# Patient Record
Sex: FEMALE | Race: WHITE | URBAN - METROPOLITAN AREA
[De-identification: names, ages, dates, MRNs, and addresses within clinical notes are randomized per-mention and may not be internally consistent; named-entity substitution may affect disease eponyms.]

---

## 2017-04-18 ENCOUNTER — INPATIENT (INPATIENT)
Facility: HOSPITAL | Age: 37
LOS: 2 days | Discharge: HOME | End: 2017-04-21
Attending: OBSTETRICS & GYNECOLOGY | Admitting: OBSTETRICS & GYNECOLOGY
Payer: COMMERCIAL

## 2017-04-18 VITALS — WEIGHT: 149.91 LBS | HEIGHT: 63 IN

## 2017-04-18 LAB
BASOPHILS NFR BLD AUTO: 0.4 % — SIGNIFICANT CHANGE UP (ref 0–2)
EOSINOPHIL NFR BLD AUTO: 1.6 % — SIGNIFICANT CHANGE UP (ref 0–6)
HCT VFR BLD CALC: 34.9 % — SIGNIFICANT CHANGE UP (ref 34.5–45)
HGB BLD-MCNC: 12.4 G/DL — SIGNIFICANT CHANGE UP (ref 11.5–15.5)
LYMPHOCYTES # BLD AUTO: 13.5 % — SIGNIFICANT CHANGE UP (ref 13–44)
MCHC RBC-ENTMCNC: 32.3 PG — SIGNIFICANT CHANGE UP (ref 27–34)
MCHC RBC-ENTMCNC: 35.5 G/DL — SIGNIFICANT CHANGE UP (ref 32–36)
MCV RBC AUTO: 90.9 FL — SIGNIFICANT CHANGE UP (ref 80–100)
MONOCYTES NFR BLD AUTO: 5.9 % — SIGNIFICANT CHANGE UP (ref 2–14)
NEUTROPHILS NFR BLD AUTO: 78.6 % — HIGH (ref 43–77)
PLATELET # BLD AUTO: 195 K/UL — SIGNIFICANT CHANGE UP (ref 150–400)
RBC # BLD: 3.84 M/UL — SIGNIFICANT CHANGE UP (ref 3.8–5.2)
RBC # FLD: 13.5 % — SIGNIFICANT CHANGE UP (ref 10.3–16.9)
WBC # BLD: 12.8 K/UL — HIGH (ref 3.8–10.5)
WBC # FLD AUTO: 12.8 K/UL — HIGH (ref 3.8–10.5)

## 2017-04-18 RX ORDER — CEFAZOLIN SODIUM 1 G
2000 VIAL (EA) INJECTION ONCE
Qty: 0 | Refills: 0 | Status: DISCONTINUED | OUTPATIENT
Start: 2017-04-18 | End: 2017-04-18

## 2017-04-18 RX ORDER — OXYTOCIN 10 UNIT/ML
41.67 VIAL (ML) INJECTION
Qty: 20 | Refills: 0 | Status: DISCONTINUED | OUTPATIENT
Start: 2017-04-19 | End: 2017-04-21

## 2017-04-18 RX ORDER — ACETAMINOPHEN 500 MG
650 TABLET ORAL EVERY 6 HOURS
Qty: 0 | Refills: 0 | Status: DISCONTINUED | OUTPATIENT
Start: 2017-04-19 | End: 2017-04-21

## 2017-04-18 RX ORDER — METOCLOPRAMIDE HCL 10 MG
10 TABLET ORAL ONCE
Qty: 0 | Refills: 0 | Status: DISCONTINUED | OUTPATIENT
Start: 2017-04-18 | End: 2017-04-18

## 2017-04-18 RX ORDER — OXYTOCIN 10 UNIT/ML
333.33 VIAL (ML) INJECTION
Qty: 20 | Refills: 0 | Status: DISCONTINUED | OUTPATIENT
Start: 2017-04-18 | End: 2017-04-20

## 2017-04-18 RX ORDER — GLYCERIN ADULT
1 SUPPOSITORY, RECTAL RECTAL AT BEDTIME
Qty: 0 | Refills: 0 | Status: DISCONTINUED | OUTPATIENT
Start: 2017-04-19 | End: 2017-04-21

## 2017-04-18 RX ORDER — ONDANSETRON 8 MG/1
4 TABLET, FILM COATED ORAL EVERY 6 HOURS
Qty: 0 | Refills: 0 | Status: DISCONTINUED | OUTPATIENT
Start: 2017-04-18 | End: 2017-04-18

## 2017-04-18 RX ORDER — SODIUM CHLORIDE 9 MG/ML
1000 INJECTION, SOLUTION INTRAVENOUS ONCE
Qty: 0 | Refills: 0 | Status: DISCONTINUED | OUTPATIENT
Start: 2017-04-18 | End: 2017-04-18

## 2017-04-18 RX ORDER — SODIUM CHLORIDE 9 MG/ML
1000 INJECTION, SOLUTION INTRAVENOUS
Qty: 0 | Refills: 0 | Status: DISCONTINUED | OUTPATIENT
Start: 2017-04-18 | End: 2017-04-18

## 2017-04-18 RX ORDER — SIMETHICONE 80 MG/1
80 TABLET, CHEWABLE ORAL EVERY 4 HOURS
Qty: 0 | Refills: 0 | Status: DISCONTINUED | OUTPATIENT
Start: 2017-04-19 | End: 2017-04-21

## 2017-04-18 RX ORDER — OXYCODONE HYDROCHLORIDE 5 MG/1
10 TABLET ORAL
Qty: 0 | Refills: 0 | Status: DISCONTINUED | OUTPATIENT
Start: 2017-04-18 | End: 2017-04-18

## 2017-04-18 RX ORDER — DOCUSATE SODIUM 100 MG
100 CAPSULE ORAL
Qty: 0 | Refills: 0 | Status: DISCONTINUED | OUTPATIENT
Start: 2017-04-19 | End: 2017-04-21

## 2017-04-18 RX ORDER — OXYTOCIN 10 UNIT/ML
41.67 VIAL (ML) INJECTION
Qty: 20 | Refills: 0 | Status: DISCONTINUED | OUTPATIENT
Start: 2017-04-18 | End: 2017-04-18

## 2017-04-18 RX ORDER — IBUPROFEN 200 MG
600 TABLET ORAL EVERY 6 HOURS
Qty: 0 | Refills: 0 | Status: DISCONTINUED | OUTPATIENT
Start: 2017-04-18 | End: 2017-04-20

## 2017-04-18 RX ORDER — HYDROMORPHONE HYDROCHLORIDE 2 MG/ML
0.5 INJECTION INTRAMUSCULAR; INTRAVENOUS; SUBCUTANEOUS
Qty: 0 | Refills: 0 | Status: DISCONTINUED | OUTPATIENT
Start: 2017-04-18 | End: 2017-04-18

## 2017-04-18 RX ORDER — TETANUS TOXOID, REDUCED DIPHTHERIA TOXOID AND ACELLULAR PERTUSSIS VACCINE, ADSORBED 5; 2.5; 8; 8; 2.5 [IU]/.5ML; [IU]/.5ML; UG/.5ML; UG/.5ML; UG/.5ML
0.5 SUSPENSION INTRAMUSCULAR ONCE
Qty: 0 | Refills: 0 | Status: DISCONTINUED | OUTPATIENT
Start: 2017-04-19 | End: 2017-04-21

## 2017-04-18 RX ORDER — OXYCODONE HYDROCHLORIDE 5 MG/1
5 TABLET ORAL
Qty: 0 | Refills: 0 | Status: DISCONTINUED | OUTPATIENT
Start: 2017-04-18 | End: 2017-04-18

## 2017-04-18 RX ORDER — DIPHENHYDRAMINE HCL 50 MG
25 CAPSULE ORAL EVERY 6 HOURS
Qty: 0 | Refills: 0 | Status: DISCONTINUED | OUTPATIENT
Start: 2017-04-19 | End: 2017-04-21

## 2017-04-18 RX ORDER — FERROUS SULFATE 325(65) MG
325 TABLET ORAL DAILY
Qty: 0 | Refills: 0 | Status: DISCONTINUED | OUTPATIENT
Start: 2017-04-19 | End: 2017-04-21

## 2017-04-18 RX ORDER — LANOLIN
1 OINTMENT (GRAM) TOPICAL
Qty: 0 | Refills: 0 | Status: DISCONTINUED | OUTPATIENT
Start: 2017-04-19 | End: 2017-04-21

## 2017-04-18 RX ORDER — CITRIC ACID/SODIUM CITRATE 300-500 MG
30 SOLUTION, ORAL ORAL ONCE
Qty: 0 | Refills: 0 | Status: DISCONTINUED | OUTPATIENT
Start: 2017-04-18 | End: 2017-04-18

## 2017-04-18 RX ORDER — SODIUM CHLORIDE 9 MG/ML
1000 INJECTION, SOLUTION INTRAVENOUS
Qty: 0 | Refills: 0 | Status: DISCONTINUED | OUTPATIENT
Start: 2017-04-19 | End: 2017-04-21

## 2017-04-18 RX ORDER — NALOXONE HYDROCHLORIDE 4 MG/.1ML
0.1 SPRAY NASAL
Qty: 0 | Refills: 0 | Status: DISCONTINUED | OUTPATIENT
Start: 2017-04-18 | End: 2017-04-18

## 2017-04-18 RX ORDER — HEPARIN SODIUM 5000 [USP'U]/ML
5000 INJECTION INTRAVENOUS; SUBCUTANEOUS EVERY 12 HOURS
Qty: 0 | Refills: 0 | Status: DISCONTINUED | OUTPATIENT
Start: 2017-04-19 | End: 2017-04-21

## 2017-04-18 RX ORDER — MORPHINE SULFATE 50 MG/1
4 CAPSULE, EXTENDED RELEASE ORAL
Qty: 0 | Refills: 0 | Status: DISCONTINUED | OUTPATIENT
Start: 2017-04-18 | End: 2017-04-21

## 2017-04-18 RX ADMIN — SODIUM CHLORIDE 125 MILLILITER(S): 9 INJECTION, SOLUTION INTRAVENOUS at 17:20

## 2017-04-18 RX ADMIN — Medication 600 MILLIGRAM(S): at 21:12

## 2017-04-18 RX ADMIN — Medication 600 MILLIGRAM(S): at 23:10

## 2017-04-18 RX ADMIN — MORPHINE SULFATE 4 MILLIGRAM(S): 50 CAPSULE, EXTENDED RELEASE ORAL at 22:15

## 2017-04-18 RX ADMIN — MORPHINE SULFATE 4 MILLIGRAM(S): 50 CAPSULE, EXTENDED RELEASE ORAL at 23:10

## 2017-04-19 LAB
HCT VFR BLD CALC: 25.5 % — LOW (ref 34.5–45)
HCT VFR BLD CALC: 28.5 % — LOW (ref 34.5–45)
HGB BLD-MCNC: 8.9 G/DL — LOW (ref 11.5–15.5)
HGB BLD-MCNC: 9.9 G/DL — LOW (ref 11.5–15.5)
MCHC RBC-ENTMCNC: 31.7 PG — SIGNIFICANT CHANGE UP (ref 27–34)
MCHC RBC-ENTMCNC: 31.7 PG — SIGNIFICANT CHANGE UP (ref 27–34)
MCHC RBC-ENTMCNC: 34.7 G/DL — SIGNIFICANT CHANGE UP (ref 32–36)
MCHC RBC-ENTMCNC: 34.9 G/DL — SIGNIFICANT CHANGE UP (ref 32–36)
MCV RBC AUTO: 90.7 FL — SIGNIFICANT CHANGE UP (ref 80–100)
MCV RBC AUTO: 91.3 FL — SIGNIFICANT CHANGE UP (ref 80–100)
PLATELET # BLD AUTO: 144 K/UL — LOW (ref 150–400)
PLATELET # BLD AUTO: 166 K/UL — SIGNIFICANT CHANGE UP (ref 150–400)
RBC # BLD: 2.81 M/UL — LOW (ref 3.8–5.2)
RBC # BLD: 3.12 M/UL — LOW (ref 3.8–5.2)
RBC # FLD: 13.5 % — SIGNIFICANT CHANGE UP (ref 10.3–16.9)
RBC # FLD: 13.8 % — SIGNIFICANT CHANGE UP (ref 10.3–16.9)
T PALLIDUM AB TITR SER: NEGATIVE — SIGNIFICANT CHANGE UP
WBC # BLD: 11.2 K/UL — HIGH (ref 3.8–10.5)
WBC # BLD: 11.5 K/UL — HIGH (ref 3.8–10.5)
WBC # FLD AUTO: 11.2 K/UL — HIGH (ref 3.8–10.5)
WBC # FLD AUTO: 11.5 K/UL — HIGH (ref 3.8–10.5)

## 2017-04-19 RX ORDER — ZINC OXIDE 200 MG/G
1 OINTMENT TOPICAL EVERY 6 HOURS
Qty: 0 | Refills: 0 | Status: DISCONTINUED | OUTPATIENT
Start: 2017-04-19 | End: 2017-04-21

## 2017-04-19 RX ADMIN — Medication 600 MILLIGRAM(S): at 04:00

## 2017-04-19 RX ADMIN — HEPARIN SODIUM 5000 UNIT(S): 5000 INJECTION INTRAVENOUS; SUBCUTANEOUS at 19:21

## 2017-04-19 RX ADMIN — Medication 600 MILLIGRAM(S): at 10:00

## 2017-04-19 RX ADMIN — SIMETHICONE 80 MILLIGRAM(S): 80 TABLET, CHEWABLE ORAL at 00:59

## 2017-04-19 RX ADMIN — HEPARIN SODIUM 5000 UNIT(S): 5000 INJECTION INTRAVENOUS; SUBCUTANEOUS at 06:47

## 2017-04-19 RX ADMIN — Medication 600 MILLIGRAM(S): at 03:10

## 2017-04-19 RX ADMIN — SIMETHICONE 80 MILLIGRAM(S): 80 TABLET, CHEWABLE ORAL at 09:05

## 2017-04-19 RX ADMIN — SODIUM CHLORIDE 125 MILLILITER(S): 9 INJECTION, SOLUTION INTRAVENOUS at 12:15

## 2017-04-19 RX ADMIN — Medication 600 MILLIGRAM(S): at 16:36

## 2017-04-19 RX ADMIN — SODIUM CHLORIDE 125 MILLILITER(S): 9 INJECTION, SOLUTION INTRAVENOUS at 06:47

## 2017-04-19 RX ADMIN — Medication 325 MILLIGRAM(S): at 12:14

## 2017-04-19 RX ADMIN — Medication 600 MILLIGRAM(S): at 09:06

## 2017-04-19 RX ADMIN — Medication 100 MILLIGRAM(S): at 22:40

## 2017-04-19 RX ADMIN — Medication 1 TABLET(S): at 12:14

## 2017-04-19 RX ADMIN — Medication 600 MILLIGRAM(S): at 17:20

## 2017-04-19 NOTE — PROGRESS NOTE ADULT - SUBJECTIVE AND OBJECTIVE BOX
Patient is s/p CSECTION POD#1. Called for heavy bleeding episode after evaluation by resident staff. patient reports feeling better. was eating breakfast and had heavy bleeding come down vaginally. resolved now that she is laying in bed. pain controlled.    I & Os for 24h ending  @ 07:00  =============================================  IN: 750 ml / OUT: 1900 ml / NET: -1150 ml    I & Os for current day (as of  @ 17:42)  =============================================  IN: 0 ml / OUT: 3650 ml / NET: -3650 ml      Objective:  Daily Height in cm: 160.02 (2017 18:47)    Daily Weight Pre-pregnancy in k (2017 18:47)  ICU Vital Signs Last 24 Hrs  T(C): 36.3, Max: 36.9 ( @ 19:15)  T(F): 97.4, Max: 98.5 ( @ 19:15)  HR: 82 (64 - 82)  BP: 104/58 (95/60 - 126/56)  RR: 18 (14 - 18)  SpO2: 98% (92% - 98%)    CAPILLARY BLOOD GLUCOSE    Height (cm): 160 ( @ 18:47)  PHYSICAL EXAM:    Constitutional: well appearing, alert and oriented x3  Breasts: deferred  Gastrointestinal: distended with tympany (expected for post-operative status), appropriately tender to touch, soft without rebound or guarding  Extremities: non edematous.  Psychiatric: appropriate mood and judgement.    Labs:                        9.9    11.5  )-----------( 166      ( 2017 13:09 )             28.5     Medications:  MEDICATIONS  (STANDING):  lactated ringers. 1000milliLiter(s) IV Continuous <Continuous>  diphtheria/tetanus/pertussis (acellular) Vaccine (ADAcel) 0.5milliLiter(s) IntraMuscular once  oxytocin Infusion 41.667milliUNIT(s)/Min IV Continuous <Continuous>  ferrous    sulfate 325milliGRAM(s) Oral daily  prenatal multivitamin 1Tablet(s) Oral daily  oxytocin Infusion 333.333milliUNIT(s)/Min IV Continuous <Continuous>  heparin  Injectable 5000Unit(s) SubCutaneous every 12 hours  ibuprofen  Suspension 600milliGRAM(s) Oral every 6 hours    MEDICATIONS  (PRN):  acetaminophen   Tablet. 650milliGRAM(s) Oral every 6 hours PRN Mild Pain (1 - 3)  acetaminophen   Tablet 650milliGRAM(s) Oral every 6 hours PRN For Temp greater than 38.5 C (101.3 F)  oxyCODONE  5 mG/acetaminophen 325 mG 1Tablet(s) Oral every 3 hours PRN Moderate Pain  oxyCODONE  5 mG/acetaminophen 325 mG 2Tablet(s) Oral every 6 hours PRN Severe Pain  simethicone 80milliGRAM(s) Chew every 4 hours PRN Gas  diphenhydrAMINE   Capsule 25milliGRAM(s) Oral every 6 hours PRN Itching  glycerin Suppository - Adult 1Suppository(s) Rectal at bedtime PRN Constipation  docusate sodium 100milliGRAM(s) Oral two times a day PRN Stool Softening  lanolin Ointment 1Application(s) Topical every 3 hours PRN Sore Nipples  morphine  - Injectable 4milliGRAM(s) IV Push every 15 minutes PRN Severe Pain (7 - 10)

## 2017-04-19 NOTE — PROGRESS NOTE ADULT - ASSESSMENT
A/P 37y G  P   s/p c/s, POD # 1 ,stable  - f/u postop cbc  Diet: Reg diet as tolerated  Pain: OPM  IV fluid: Saline lock  OOB/SCDs/IS  Continue to monitor  Anticipate discharge POD #3 or #4

## 2017-04-19 NOTE — PROGRESS NOTE ADULT - SUBJECTIVE AND OBJECTIVE BOX
Patient evaluated at bedside.   She reports pain is well controlled with  She denies headache, dizziness, chest pain, palpitations, shortness of breathe, nausea, vomiting or heavy vaginal bleeding.  She has not been ambulating or voiding spontaneously due to sue and SCDs. She is passing gas, tolerating clear diet and is breastfeeding.    Physical Exam:  Vital Signs Last 24 Hrs  T(C): 36.9, Max: 36.9 (04-18 @ 19:15)  T(F): 98.5, Max: 98.5 (04-18 @ 19:15)  HR: 69 (64 - 80)  BP: 108/57 (102/68 - 126/56)  BP(mean): --  RR: 14 (14 - 18)  SpO2: 97% (92% - 97%)    GA: NAD, A+0 x 3  CV: RRR  Pulm: CTAB  Breasts: soft, nontender, no palpable masses  Abd: + BS, soft, nontender, nondistended, no rebound or guarding, pressure dressing kept on, uterus firm at midline, 1 fb below umbilicus  : lochia WNL  Sue: in situ and clear   Extremities: no swelling or calf tenderness,                            12.4   12.8  )-----------( 195      ( 18 Apr 2017 17:23 )             34.9

## 2017-04-19 NOTE — PROGRESS NOTE ADULT - ASSESSMENT
A/P:   PABLO BLAKELY 37y Female s/p repeat CD due previous CS.      vaginal bleeding was heavy, resolved. bimanual exam reported to be minimal bleeding. no bleeding on pad per nursing staff. patient feeling improved after hydration and return to bed. will monitor closely for change in status. stable and vital signs normal.

## 2017-04-20 LAB
HCT VFR BLD CALC: 27.5 % — LOW (ref 34.5–45)
HCT VFR BLD CALC: 27.6 % — LOW (ref 34.5–45)
HGB BLD-MCNC: 9.3 G/DL — LOW (ref 11.5–15.5)
HGB BLD-MCNC: 9.6 G/DL — LOW (ref 11.5–15.5)
MCHC RBC-ENTMCNC: 31.1 PG — SIGNIFICANT CHANGE UP (ref 27–34)
MCHC RBC-ENTMCNC: 32.1 PG — SIGNIFICANT CHANGE UP (ref 27–34)
MCHC RBC-ENTMCNC: 33.8 G/DL — SIGNIFICANT CHANGE UP (ref 32–36)
MCHC RBC-ENTMCNC: 34.8 G/DL — SIGNIFICANT CHANGE UP (ref 32–36)
MCV RBC AUTO: 92 FL — SIGNIFICANT CHANGE UP (ref 80–100)
MCV RBC AUTO: 92.3 FL — SIGNIFICANT CHANGE UP (ref 80–100)
PLATELET # BLD AUTO: 174 K/UL — SIGNIFICANT CHANGE UP (ref 150–400)
PLATELET # BLD AUTO: 187 K/UL — SIGNIFICANT CHANGE UP (ref 150–400)
RBC # BLD: 2.99 M/UL — LOW (ref 3.8–5.2)
RBC # BLD: 2.99 M/UL — LOW (ref 3.8–5.2)
RBC # FLD: 14.2 % — SIGNIFICANT CHANGE UP (ref 10.3–16.9)
RBC # FLD: 14.2 % — SIGNIFICANT CHANGE UP (ref 10.3–16.9)
WBC # BLD: 11 K/UL — HIGH (ref 3.8–10.5)
WBC # BLD: 11.3 K/UL — HIGH (ref 3.8–10.5)
WBC # FLD AUTO: 11 K/UL — HIGH (ref 3.8–10.5)
WBC # FLD AUTO: 11.3 K/UL — HIGH (ref 3.8–10.5)

## 2017-04-20 RX ORDER — IBUPROFEN 200 MG
600 TABLET ORAL EVERY 6 HOURS
Qty: 0 | Refills: 0 | Status: DISCONTINUED | OUTPATIENT
Start: 2017-04-20 | End: 2017-04-21

## 2017-04-20 RX ADMIN — Medication 600 MILLIGRAM(S): at 16:28

## 2017-04-20 RX ADMIN — HEPARIN SODIUM 5000 UNIT(S): 5000 INJECTION INTRAVENOUS; SUBCUTANEOUS at 18:16

## 2017-04-20 RX ADMIN — Medication 600 MILLIGRAM(S): at 15:44

## 2017-04-20 RX ADMIN — Medication 1 TABLET(S): at 12:58

## 2017-04-20 RX ADMIN — HEPARIN SODIUM 5000 UNIT(S): 5000 INJECTION INTRAVENOUS; SUBCUTANEOUS at 05:39

## 2017-04-20 RX ADMIN — Medication 600 MILLIGRAM(S): at 10:00

## 2017-04-20 RX ADMIN — ZINC OXIDE 1 APPLICATION(S): 200 OINTMENT TOPICAL at 09:17

## 2017-04-20 RX ADMIN — Medication 325 MILLIGRAM(S): at 12:58

## 2017-04-20 RX ADMIN — Medication 600 MILLIGRAM(S): at 09:16

## 2017-04-20 RX ADMIN — Medication 100 MILLIGRAM(S): at 12:58

## 2017-04-20 NOTE — PROGRESS NOTE ADULT - SUBJECTIVE AND OBJECTIVE BOX
Patient evaluated at bedside.   She reports pain is well controlled with opm  She denies headache, dizziness, chest pain, palpitations, shortness of breathe, nausea, vomiting or heavy vaginal bleeding as she was yesterday. The one episode of vb was evaluated at bedside with a bimanual that resulted in minimal extraction of clots and blood. Repeat CBC at that time showed Hg was stable.  She has been ambulating and voiding spontaneously. She is passing gas, tolerating reg diet and is breastfeeding.    Physical Exam:  Vital Signs Last 24 Hrs  T(C): 37, Max: 37 (04-20 @ 05:53)  T(F): 98.6, Max: 98.6 (04-20 @ 05:53)  HR: 61 (61 - 88)  BP: 123/82 (95/59 - 123/82)  BP(mean): --  RR: 18 (17 - 18)  SpO2: 96% (96% - 99%)    GA: NAD, A+0 x 3  Abd: + BS, soft, nontender, nondistended, no rebound or guarding, pressure dressing kept on, uterus firm at midline, 1 fb below umbilicus  : lochia WNL  Extremities: no swelling or calf tenderness,    Complete Blood Count (04.19.17 @ 13:09)    WBC Count: 11.5 K/uL    RBC Count: 3.12 M/uL    Hemoglobin: 9.9 g/dL    Hematocrit: 28.5 %    Mean Cell Volume: 91.3 fL    Mean Cell Hemoglobin: 31.7 pg    Mean Cell Hemoglobin Conc: 34.7 g/dL    Red Cell Distrib Width: 13.5 %    Platelet Count - Automated: 166 K/uL                        12.4   12.8  )-----------( 195      ( 18 Apr 2017 17:23 )             34.9

## 2017-04-20 NOTE — PROGRESS NOTE ADULT - ASSESSMENT
A/P  yo 37y G 2 P2   s/p c/s, POD # 2 ,stable  1. Pain:OPM  2. GI:REG  3. : VOIDING  4. DVT prophylaxis:SCD  5. Dispo:POD4  6.repeat CBC now  7.f/u vital signs

## 2017-04-20 NOTE — PROGRESS NOTE ADULT - SUBJECTIVE AND OBJECTIVE BOX
Patient evaluated at bedside after passing small clots.    No acute events overnight.  She reports pain is well controlled with OPM  She denies heavy vaginal bleeding or perineal discomfort.  She has been ambulating without assistance, voiding spontaneously.    Physical Exam:  T(C): 37, Max: 37 (04-20 @ 05:53)  HR: 61 (61 - 61)  BP: 123/82 (123/82 - 123/82)  RR: 18 (18 - 18)  SpO2: 96% (96% - 96%)  Wt(kg): --    GA: NAD, A+0 x 3  Abd: + BS, soft, nontender, nondistended, no rebound or guarding, uterus firm at midline,   fb below umbilicus  : lochia WNL  Extremities: no swelling or calf tenderness                            9.9    11.5  )-----------( 166      ( 19 Apr 2017 13:09 )             28.5

## 2017-04-20 NOTE — PROGRESS NOTE ADULT - ASSESSMENT
A/P 37y G  P   s/p c/s, POD # 2 ,stable  Diet: Reg diet as tolerated  Pain: OPM  IV fluid: Saline lock  OOB/SCDs/IS  Continue to monitor  Anticipate discharge POD #3 or #4

## 2017-04-21 VITALS
OXYGEN SATURATION: 98 % | DIASTOLIC BLOOD PRESSURE: 83 MMHG | SYSTOLIC BLOOD PRESSURE: 122 MMHG | TEMPERATURE: 98 F | HEART RATE: 86 BPM | RESPIRATION RATE: 18 BRPM

## 2017-04-21 RX ADMIN — Medication 600 MILLIGRAM(S): at 11:46

## 2017-04-21 RX ADMIN — Medication 600 MILLIGRAM(S): at 05:46

## 2017-04-21 RX ADMIN — HEPARIN SODIUM 5000 UNIT(S): 5000 INJECTION INTRAVENOUS; SUBCUTANEOUS at 06:40

## 2017-04-21 RX ADMIN — Medication 100 MILLIGRAM(S): at 11:45

## 2017-04-21 RX ADMIN — ZINC OXIDE 1 APPLICATION(S): 200 OINTMENT TOPICAL at 11:46

## 2017-04-21 RX ADMIN — Medication 325 MILLIGRAM(S): at 11:45

## 2017-04-21 RX ADMIN — Medication 600 MILLIGRAM(S): at 11:33

## 2017-04-21 RX ADMIN — Medication 600 MILLIGRAM(S): at 05:14

## 2017-04-21 RX ADMIN — Medication 1 TABLET(S): at 11:45

## 2017-04-21 RX ADMIN — Medication 1 APPLICATION(S): at 07:05

## 2017-04-21 NOTE — PROGRESS NOTE ADULT - ATTENDING COMMENTS
patient stable and afebrile  h/h stable over 3 days  no tachycardia  denies any chest pain, sob, palpitations  will d/c home , to continue taking iron tbs 3 times a day  f/up in one week in office for wound check

## 2017-04-21 NOTE — DISCHARGE NOTE OB - PATIENT PORTAL LINK FT
“You can access the [facility] Patient Portal, offered by [facility], by registering with the following website: [email or web address]”

## 2017-04-21 NOTE — DISCHARGE NOTE OB - CARE PLAN
Principal Discharge DX:	Postpartum state  Goal:	n/a  Instructions for follow-up, activity and diet:	Follow up with your OB in 2 weeks. Call the office to schedule your post partum visit.  Regular diet. No heavy lifting. Pelvic rest for 6 weeks.  This includes no tampons, douching or intercourse. Call with any signs of symptoms of infection including fever > 100.4 degrees, severe pain, malodorous vaginal discharge or bleeding > 1 pad/hour. Motrin 600 mg orally every 6 hours for pain. Continue your prenatal vitamins as directed. Contraception options, including IUD can be further discussed at the post partum visit.

## 2017-04-21 NOTE — DISCHARGE NOTE OB - CARE PROVIDER_API CALL
Feliberto Negro), Obstetrics and Gynecology  [address] [place] [address]  Phone: [phone]  Fax: [phone]

## 2017-04-21 NOTE — PROGRESS NOTE ADULT - ASSESSMENT
A/P  37y G 2 P2   s/p c/s, POD # 3 ,stable  1. Pain:OPM  2. GI:REG  3. : VOIDING  4. DVT prophylaxis:SCD  5. Dispo:POD4  6.repeat CBC showed Hg stable. Continue monitoring for clots  7.f/u vital signs

## 2017-04-21 NOTE — PROGRESS NOTE ADULT - SUBJECTIVE AND OBJECTIVE BOX
Patient evaluated at bedside.  No acute events overnight.  She reports pain is well controlled with OPM  She denies heavy vaginal bleeding or perineal discomfort.  She has been ambulating without assistance, voiding spontaneously.    Physical Exam:  Vital Signs Last 24 Hrs  T(C): 36.8, Max: 36.8 (04-20 @ 17:30)  T(F): 98.2, Max: 98.2 (04-20 @ 17:30)  HR: 86 (71 - 100)  BP: 113/75 (110/74 - 113/75)  BP(mean): --  RR: 18 (16 - 18)  SpO2: 97% (97% - 98%)    GA: NAD, A+0 x 3  Abd: + BS, soft, nontender, nondistended, no rebound or guarding, uterus firm at midline,  2 fb below umbilicus  : lochia WNL  Extremities: no swelling or calf tenderness    Complete Blood Count (04.20.17 @ 12:44)    WBC Count: 11.0 K/uL    RBC Count: 2.99 M/uL    Hemoglobin: 9.6 g/dL    Hematocrit: 27.6 %    Mean Cell Volume: 92.3 fL    Mean Cell Hemoglobin: 32.1 pg    Mean Cell Hemoglobin Conc: 34.8 g/dL    Red Cell Distrib Width: 14.2 %    Platelet Count - Automated: 174 K/uL                            9.9    11.5  )-----------( 166      ( 19 Apr 2017 13:09 )             28.5

## 2017-04-24 DIAGNOSIS — Z3A.38 38 WEEKS GESTATION OF PREGNANCY: ICD-10-CM

## 2017-04-24 DIAGNOSIS — Z88.6 ALLERGY STATUS TO ANALGESIC AGENT: ICD-10-CM

## 2017-04-24 DIAGNOSIS — O34.211 MATERNAL CARE FOR LOW TRANSVERSE SCAR FROM PREVIOUS CESAREAN DELIVERY: ICD-10-CM

## 2017-04-24 DIAGNOSIS — Z34.83 ENCOUNTER FOR SUPERVISION OF OTHER NORMAL PREGNANCY, THIRD TRIMESTER: ICD-10-CM

## 2017-07-23 PROCEDURE — 85025 COMPLETE CBC W/AUTO DIFF WBC: CPT

## 2017-07-23 PROCEDURE — 36415 COLL VENOUS BLD VENIPUNCTURE: CPT

## 2017-07-23 PROCEDURE — 86780 TREPONEMA PALLIDUM: CPT

## 2017-07-23 PROCEDURE — 86900 BLOOD TYPING SEROLOGIC ABO: CPT

## 2017-07-23 PROCEDURE — 86901 BLOOD TYPING SEROLOGIC RH(D): CPT

## 2017-07-23 PROCEDURE — 86850 RBC ANTIBODY SCREEN: CPT

## 2017-07-23 PROCEDURE — 86923 COMPATIBILITY TEST ELECTRIC: CPT

## 2017-07-23 PROCEDURE — 85027 COMPLETE CBC AUTOMATED: CPT

## 2020-02-20 NOTE — PATIENT PROFILE OB - BABY A: COMPLICATIONS, DELIVERY
none Surgeon/Pathologist Verbiage (Will Incorporate Name Of Surgeon From Intro If Not Blank): operated in two distinct and integrated capacities as the surgeon and pathologist.